# Patient Record
Sex: FEMALE | Race: WHITE | URBAN - METROPOLITAN AREA
[De-identification: names, ages, dates, MRNs, and addresses within clinical notes are randomized per-mention and may not be internally consistent; named-entity substitution may affect disease eponyms.]

---

## 2017-07-28 ENCOUNTER — IMPORTED ENCOUNTER (OUTPATIENT)
Dept: URBAN - METROPOLITAN AREA CLINIC 1 | Facility: CLINIC | Age: 52
End: 2017-07-28

## 2017-07-28 PROBLEM — H52.4: Noted: 2017-07-28

## 2017-07-28 PROCEDURE — S0621 ROUTINE OPHTHALMOLOGICAL EXA: HCPCS

## 2017-07-28 NOTE — PATIENT DISCUSSION
1.  Presbyopia: Rx was given for corrective spectacles if indicated. 2.  Return for an appointment in 1 week for Contact lens check. with Dr. Adithya Bautista. 3.  Return for an appointment in 1 year for 38 and Contact lens check. with Dr. Adithya Bautista.

## 2017-08-10 ENCOUNTER — IMPORTED ENCOUNTER (OUTPATIENT)
Dept: URBAN - METROPOLITAN AREA CLINIC 1 | Facility: CLINIC | Age: 52
End: 2017-08-10

## 2017-08-10 NOTE — PATIENT DISCUSSION
1.  CC today - new trials of MF CTLs givenReturn for an appointment in 1 week cc with Dr. Makenzie Gamble.

## 2017-08-21 ENCOUNTER — IMPORTED ENCOUNTER (OUTPATIENT)
Dept: URBAN - METROPOLITAN AREA CLINIC 1 | Facility: CLINIC | Age: 52
End: 2017-08-21

## 2017-08-21 NOTE — PATIENT DISCUSSION
1.  CC today- Good fit good comfort. Va doing well. Finalized CTL RX. Return for an appointment for Return as scheduled with Dr. Danny San.

## 2018-07-27 ENCOUNTER — IMPORTED ENCOUNTER (OUTPATIENT)
Dept: URBAN - METROPOLITAN AREA CLINIC 1 | Facility: CLINIC | Age: 53
End: 2018-07-27

## 2018-07-27 PROBLEM — H52.13: Noted: 2018-07-27

## 2018-07-27 PROCEDURE — S0621 ROUTINE OPHTHALMOLOGICAL EXA: HCPCS

## 2018-07-27 NOTE — PATIENT DISCUSSION
1. Myopia OU- Rx for glasses given. (Pt states she does not need a CL Rx this visit) 2. RONAK OU- Use ATs TID OU Routinely. 3. Incipient Cataracts OU- Observe 4. H/o CL Wear Return for an appointment in 1 yr 40/cc with Dr. Shonda Oswald.

## 2019-07-26 ENCOUNTER — IMPORTED ENCOUNTER (OUTPATIENT)
Dept: URBAN - METROPOLITAN AREA CLINIC 1 | Facility: CLINIC | Age: 54
End: 2019-07-26

## 2019-07-26 PROBLEM — H52.13: Noted: 2019-07-26

## 2019-07-26 PROBLEM — H52.4: Noted: 2019-07-26

## 2019-07-26 PROCEDURE — S0621 ROUTINE OPHTHALMOLOGICAL EXA: HCPCS

## 2019-07-26 NOTE — PATIENT DISCUSSION
1. Myopia: Rx was given for correction if indicated and requested. 2. Presbyopia: Rx was given for corrective spectacles if indicated. 3.  RONAK: AT's TID OU routinely. 4. Incipient Cataracts: ObserveReturn for an appointment in 1 year 40 cc with Dr. David Calzada.

## 2020-07-31 ENCOUNTER — IMPORTED ENCOUNTER (OUTPATIENT)
Dept: URBAN - METROPOLITAN AREA CLINIC 1 | Facility: CLINIC | Age: 55
End: 2020-07-31

## 2020-07-31 PROBLEM — H52.13: Noted: 2020-07-31

## 2020-07-31 PROBLEM — H52.4: Noted: 2020-07-31

## 2020-07-31 PROCEDURE — S0621 ROUTINE OPHTHALMOLOGICAL EXA: HCPCS

## 2020-07-31 NOTE — PATIENT DISCUSSION
1. Myopia -- Rx was given for correction if indicated and requested. 2. Presbyopia --3. RONAK OU -- Use ATs TID OU Routinely. 4.  Cataracts OU -- Observe Finalized CTLs today. Return for an appointment in 1 year 40/cc with Dr. Ilene Reed.

## 2021-01-02 ENCOUNTER — NURSE TRIAGE (OUTPATIENT)
Dept: OTHER | Facility: CLINIC | Age: 56
End: 2021-01-02

## 2021-01-02 NOTE — TELEPHONE ENCOUNTER
Reason for Disposition   [1] Caller requesting NON-URGENT health information AND [2] PCP's office is the best resource    Answer Assessment - Initial Assessment Questions  1. REASON FOR CALL or QUESTION: \"What is your reason for calling today? \" or \"How can I best help you? \" or \"What question do you have that I can help answer? \"      Has a cystoscopy scheduled for Monday 2:15pm.   Caller was exposed on 12/26 to a now positive COVID patient. The covid positive person developed symptoms on 12/29, tested, came back positive on 12/31. Caller asking if she should cancel her appointment. Protocols used: INFORMATION ONLY CALL - NO TRIAGE-ADULT-AH    Caller is wanting to post-pone her procedure due to the exposure to COVID. Caller states she can not reach anyone at office. She is concerned about the $50.00 cancellation / no -show fee. Advised I will document record and she needs to call first thing Monday morning to advise / re-schedule. Appt is with Dr. Rose Tyler. Currently has no symptoms. Call back if symptoms develop or additional questions.

## 2021-08-19 ENCOUNTER — IMPORTED ENCOUNTER (OUTPATIENT)
Dept: URBAN - METROPOLITAN AREA CLINIC 1 | Facility: CLINIC | Age: 56
End: 2021-08-19

## 2021-08-19 PROBLEM — H52.221: Noted: 2021-08-19

## 2021-08-19 PROBLEM — H52.4: Noted: 2021-08-19

## 2021-08-19 PROBLEM — H52.13: Noted: 2021-08-19

## 2021-08-19 PROCEDURE — S0621 ROUTINE OPHTHALMOLOGICAL EXA: HCPCS

## 2021-08-19 NOTE — PATIENT DISCUSSION
1. Myopia w/ Astigmatism OD -- Rx was given for correction if indicated and requested. 2. Presbyopia 3. Cataract OU -- Observe. 4.  Dry Eyes OU -- Cont ATs TID OU routinely. CTL trials given to patient (DT1 MF). Return for an appointment in 1 week cc with Dr. Charles Hunt.

## 2021-09-16 ENCOUNTER — IMPORTED ENCOUNTER (OUTPATIENT)
Dept: URBAN - METROPOLITAN AREA CLINIC 1 | Facility: CLINIC | Age: 56
End: 2021-09-16

## 2021-09-16 NOTE — PATIENT DISCUSSION
1.  CC today -- patient happy with comfort and near vision but distance vision is not as clear. Discussed expectations with patient and advised that the best vision overall would be achieved by glasses wear pt understands. CTL RX finalized and given to patient today. Stressed that these CTLs are not FDA approved for sleeping in stressed proper CTL care. Recommend ATs BID OU routinely. (Sample given of Refresh)Return for an appointment in August 40/cc with Dr. Makenzie Gamble.

## 2022-04-03 ASSESSMENT — TONOMETRY
OS_IOP_MMHG: 14
OD_IOP_MMHG: 13
OS_IOP_MMHG: 14
OS_IOP_MMHG: 13
OD_IOP_MMHG: 13
OD_IOP_MMHG: 14
OD_IOP_MMHG: 14
OS_IOP_MMHG: 12
OS_IOP_MMHG: 13
OD_IOP_MMHG: 12

## 2022-04-03 ASSESSMENT — KERATOMETRY
OS_K1POWER_DIOPTERS: 44.00
OD_K1POWER_DIOPTERS: 43.25
OS_AXISANGLE2_DEGREES: 090
OD_AXISANGLE2_DEGREES: 073
OD_K2POWER_DIOPTERS: 44.00
OS_AXISANGLE_DEGREES: 180
OD_AXISANGLE_DEGREES: 163
OS_K2POWER_DIOPTERS: 45.25

## 2022-04-03 ASSESSMENT — VISUAL ACUITY
OU_SC: 20/25+1
OS_CC: J1
OD_SC: 20/30
OD_CC: J2
OD_SC: 20/25
OS_CC: J1+
OS_CC: J1+
OU_CC: J2
OS_CC: J1
OD_CC: J1
OD_CC: J1
OS_SC: 20/20
OS_SC: 20/25
OD_CC: J1+
OD_SC: 20/20
OD_CC: J1
OS_CC: J2
OS_SC: 20/20
OD_SC: 20/20
OS_CC: J1
OS_SC: 20/30
OD_SC: 20/20
OS_SC: 20/20
OS_SC: 20/20
OD_CC: J1+
OD_SC: 20/20-1

## 2023-07-21 ENCOUNTER — COMPREHENSIVE EXAM (OUTPATIENT)
Dept: URBAN - METROPOLITAN AREA CLINIC 1 | Facility: CLINIC | Age: 58
End: 2023-07-21

## 2023-07-21 DIAGNOSIS — Z01.00: ICD-10-CM

## 2023-07-21 DIAGNOSIS — H52.4: ICD-10-CM

## 2023-07-21 DIAGNOSIS — Z46.0: ICD-10-CM

## 2023-07-21 PROCEDURE — 92015 DETERMINE REFRACTIVE STATE: CPT

## 2023-07-21 PROCEDURE — 92014 COMPRE OPH EXAM EST PT 1/>: CPT

## 2023-07-21 PROCEDURE — 92310-12 CONTACT LENS FITTING - 90

## 2023-07-21 ASSESSMENT — TONOMETRY
OS_IOP_MMHG: 12
OD_IOP_MMHG: 12

## 2023-07-21 ASSESSMENT — VISUAL ACUITY
OS_CC: 20/20-1
OS_CC: J1+
OD_CC: J1+
OD_CC: 20/25-1

## 2024-07-24 ENCOUNTER — COMPREHENSIVE EXAM (OUTPATIENT)
Dept: URBAN - METROPOLITAN AREA CLINIC 1 | Facility: CLINIC | Age: 59
End: 2024-07-24

## 2024-07-24 DIAGNOSIS — H52.13: ICD-10-CM

## 2024-07-24 DIAGNOSIS — Z46.0: ICD-10-CM

## 2024-07-24 DIAGNOSIS — H52.4: ICD-10-CM

## 2024-07-24 DIAGNOSIS — H52.221: ICD-10-CM

## 2024-07-24 DIAGNOSIS — Z01.00: ICD-10-CM

## 2024-07-24 PROCEDURE — 92014 COMPRE OPH EXAM EST PT 1/>: CPT

## 2024-07-24 PROCEDURE — 92310-12 CONTACT LENS FITTING - 90

## 2024-07-24 PROCEDURE — 92015 DETERMINE REFRACTIVE STATE: CPT

## 2024-07-24 ASSESSMENT — VISUAL ACUITY
OD_CC: J3
OD_BAT: 20/30
OS_BAT: 20/30
OS_CC: 20/20-2
OD_CC: 20/30+2
OS_CC: J3

## 2024-07-24 ASSESSMENT — TONOMETRY
OD_IOP_MMHG: 14
OS_IOP_MMHG: 14

## 2024-08-07 ENCOUNTER — CONTACT LENSES/GLASSES VISIT (OUTPATIENT)
Dept: URBAN - METROPOLITAN AREA CLINIC 1 | Facility: CLINIC | Age: 59
End: 2024-08-07

## 2024-08-07 DIAGNOSIS — Z46.0: ICD-10-CM

## 2024-08-07 PROCEDURE — 92310-F CONTACT LENS FITTING FOLLOW UP

## 2024-08-07 ASSESSMENT — VISUAL ACUITY
OD_CC: J1
OS_CC: 20/25-2

## 2024-10-18 ENCOUNTER — COMPREHENSIVE EXAM (OUTPATIENT)
Dept: URBAN - METROPOLITAN AREA CLINIC 1 | Facility: CLINIC | Age: 59
End: 2024-10-18

## 2024-10-18 DIAGNOSIS — H16.403: ICD-10-CM

## 2024-10-18 DIAGNOSIS — H25.813: ICD-10-CM

## 2024-10-18 DIAGNOSIS — E11.9: ICD-10-CM

## 2024-10-18 PROCEDURE — 92014 COMPRE OPH EXAM EST PT 1/>: CPT

## 2025-08-07 ENCOUNTER — COMPREHENSIVE EXAM (OUTPATIENT)
Age: 60
End: 2025-08-07

## 2025-08-07 DIAGNOSIS — Z01.00: ICD-10-CM

## 2025-08-07 DIAGNOSIS — H52.13: ICD-10-CM

## 2025-08-07 DIAGNOSIS — H52.4: ICD-10-CM

## 2025-08-07 DIAGNOSIS — Z46.0: ICD-10-CM

## 2025-08-07 DIAGNOSIS — H52.221: ICD-10-CM

## 2025-08-07 PROCEDURE — 92310-3 LEVEL 3 SOFT LENS UPDATE

## 2025-08-07 PROCEDURE — 92015 DETERMINE REFRACTIVE STATE: CPT

## 2025-08-07 PROCEDURE — 92014 COMPRE OPH EXAM EST PT 1/>: CPT
